# Patient Record
(demographics unavailable — no encounter records)

---

## 2025-02-27 NOTE — HISTORY OF PRESENT ILLNESS
[FreeTextEntry1] : Follow up   Jarrell is a 24 year old male presenting with questionable seizures.  First episode in April 2024. Woke up in the middle of the night with intense feeling of anxiety and heart racing.  Second event two weeks later. Again, woke up from sleep to feeling of anxiety. Remained conscious, but felt his body shaking and heard himself making noises for about 30 seconds - 1 minute. Followed by a period of slurred speech and fogginess, but fully returns to baseline in about 15 minutes.  Following this second event, PCP Dr. Sloan prescribed Keppra 750mg ER. Ordered rEEG in June, which was normal. MRI normal.  Several events witnessed by girlfriend, who we spoke with over the phone. Notes pt makes a weird noise, his body is rigid with his eyes rolled back. Pt notes he is able to hear what she is saying, but is unable to respond.  Estimates he has had 6 events since April, having 4 since starting Keppra. Most recent was in July. After this event, increased Keppra to 1000mg.  Maternal uncle has a history of epilepsy, presented in his 50s.   HPI    2/27/25  Continues to have events qmonth Similar from sleep to feeling of anxiety. Remained conscious, but felt his body shaking and heard himself making noises for about 30 seconds - 1 minute. Followed by a period of slurred speech and fogginess, but fully returns to baseline in about 15 minutes. Last one a few weeks ago  Triggers: none No LOC  Taking Keppra 1000 mg a day  Works : M-F and weekends off Works in [employer] so he has no cell phone during the day./

## 2025-02-27 NOTE — DISCUSSION/SUMMARY
[FreeTextEntry1] : 24 year old male with history of stereotyped events during sleep with retained awareness. 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

## 2025-02-27 NOTE — PHYSICAL EXAM
[Person] : oriented to person [Place] : oriented to place [Time] : oriented to time [Fluency] : fluency intact [Motor Handedness Right-Handed] : the patient is right hand dominant

## 2025-02-27 NOTE — REASON FOR VISIT
[Home] : at home, [unfilled] , at the time of the visit. [Medical Office: ([facility])___] : at the medical office located in  [Telehealth (audio & video)] : This visit was provided via telehealth using real-time 2-way audio visual technology. [Verbal consent obtained from patient] : the patient, [unfilled] [Follow-Up: _____] : a [unfilled] follow-up visit